# Patient Record
Sex: FEMALE | Race: BLACK OR AFRICAN AMERICAN | Employment: FULL TIME | URBAN - METROPOLITAN AREA
[De-identification: names, ages, dates, MRNs, and addresses within clinical notes are randomized per-mention and may not be internally consistent; named-entity substitution may affect disease eponyms.]

---

## 2017-06-07 RX ORDER — CLONAZEPAM 0.5 MG/1
0.5 TABLET ORAL NIGHTLY PRN
COMMUNITY
End: 2017-06-10

## 2017-06-07 RX ORDER — ASCORBIC ACID 500 MG
500 TABLET ORAL DAILY
COMMUNITY
End: 2019-12-02

## 2017-06-07 RX ORDER — LORATADINE 10 MG/1
10 TABLET ORAL AS NEEDED
COMMUNITY
End: 2021-07-23

## 2017-06-19 ENCOUNTER — ANESTHESIA EVENT (OUTPATIENT)
Dept: SURGERY | Facility: HOSPITAL | Age: 27
End: 2017-06-19
Payer: COMMERCIAL

## 2017-06-19 ENCOUNTER — SURGERY (OUTPATIENT)
Age: 27
End: 2017-06-19

## 2017-06-19 ENCOUNTER — HOSPITAL ENCOUNTER (OUTPATIENT)
Facility: HOSPITAL | Age: 27
Setting detail: HOSPITAL OUTPATIENT SURGERY
Discharge: HOME | End: 2017-06-19
Attending: SURGERY | Admitting: SURGERY
Payer: COMMERCIAL

## 2017-06-19 ENCOUNTER — ANESTHESIA (OUTPATIENT)
Dept: SURGERY | Facility: HOSPITAL | Age: 27
End: 2017-06-19
Payer: COMMERCIAL

## 2017-06-19 VITALS
OXYGEN SATURATION: 100 % | BODY MASS INDEX: 36.02 KG/M2 | RESPIRATION RATE: 18 BRPM | DIASTOLIC BLOOD PRESSURE: 75 MMHG | WEIGHT: 211 LBS | SYSTOLIC BLOOD PRESSURE: 112 MMHG | HEIGHT: 64 IN | HEART RATE: 105 BPM | TEMPERATURE: 98 F

## 2017-06-19 PROCEDURE — 81025 URINE PREGNANCY TEST: CPT | Performed by: SURGERY

## 2017-06-19 PROCEDURE — 0HQ8XZZ REPAIR BUTTOCK SKIN, EXTERNAL APPROACH: ICD-10-PCS | Performed by: SURGERY

## 2017-06-19 PROCEDURE — 88304 TISSUE EXAM BY PATHOLOGIST: CPT | Performed by: SURGERY

## 2017-06-19 PROCEDURE — 0HB8XZZ EXCISION OF BUTTOCK SKIN, EXTERNAL APPROACH: ICD-10-PCS | Performed by: SURGERY

## 2017-06-19 RX ORDER — SODIUM CHLORIDE, SODIUM LACTATE, POTASSIUM CHLORIDE, CALCIUM CHLORIDE 600; 310; 30; 20 MG/100ML; MG/100ML; MG/100ML; MG/100ML
INJECTION, SOLUTION INTRAVENOUS CONTINUOUS
Status: DISCONTINUED | OUTPATIENT
Start: 2017-06-19 | End: 2017-06-19

## 2017-06-19 RX ORDER — HYDROCODONE BITARTRATE AND ACETAMINOPHEN 5; 325 MG/1; MG/1
2 TABLET ORAL AS NEEDED
Status: COMPLETED | OUTPATIENT
Start: 2017-06-19 | End: 2017-06-19

## 2017-06-19 RX ORDER — GENTAMICIN SULFATE 80 MG/100ML
INJECTION, SOLUTION INTRAVENOUS
Status: DISCONTINUED | OUTPATIENT
Start: 2017-06-19 | End: 2017-06-19

## 2017-06-19 RX ORDER — MEPERIDINE HYDROCHLORIDE 25 MG/ML
12.5 INJECTION INTRAMUSCULAR; INTRAVENOUS; SUBCUTANEOUS AS NEEDED
Status: DISCONTINUED | OUTPATIENT
Start: 2017-06-19 | End: 2017-06-19

## 2017-06-19 RX ORDER — HYDROCODONE BITARTRATE AND ACETAMINOPHEN 5; 325 MG/1; MG/1
1-2 TABLET ORAL EVERY 6 HOURS PRN
Qty: 60 TABLET | Refills: 0 | Status: SHIPPED | OUTPATIENT
Start: 2017-06-19 | End: 2017-09-27

## 2017-06-19 RX ORDER — ONDANSETRON 2 MG/ML
4 INJECTION INTRAMUSCULAR; INTRAVENOUS AS NEEDED
Status: DISCONTINUED | OUTPATIENT
Start: 2017-06-19 | End: 2017-06-19

## 2017-06-19 RX ORDER — METOCLOPRAMIDE HYDROCHLORIDE 5 MG/ML
10 INJECTION INTRAMUSCULAR; INTRAVENOUS AS NEEDED
Status: DISCONTINUED | OUTPATIENT
Start: 2017-06-19 | End: 2017-06-19

## 2017-06-19 RX ORDER — HYDROMORPHONE HYDROCHLORIDE 1 MG/ML
0.4 INJECTION, SOLUTION INTRAMUSCULAR; INTRAVENOUS; SUBCUTANEOUS EVERY 5 MIN PRN
Status: DISCONTINUED | OUTPATIENT
Start: 2017-06-19 | End: 2017-06-19

## 2017-06-19 RX ORDER — HYDROCODONE BITARTRATE AND ACETAMINOPHEN 5; 325 MG/1; MG/1
1 TABLET ORAL AS NEEDED
Status: COMPLETED | OUTPATIENT
Start: 2017-06-19 | End: 2017-06-19

## 2017-06-19 RX ORDER — BUPIVACAINE HYDROCHLORIDE 5 MG/ML
INJECTION, SOLUTION EPIDURAL; INTRACAUDAL AS NEEDED
Status: DISCONTINUED | OUTPATIENT
Start: 2017-06-19 | End: 2017-06-19 | Stop reason: HOSPADM

## 2017-06-19 RX ORDER — LIDOCAINE HYDROCHLORIDE AND EPINEPHRINE 10; 10 MG/ML; UG/ML
INJECTION, SOLUTION INFILTRATION; PERINEURAL AS NEEDED
Status: DISCONTINUED | OUTPATIENT
Start: 2017-06-19 | End: 2017-06-19 | Stop reason: HOSPADM

## 2017-06-19 RX ORDER — NALOXONE HYDROCHLORIDE 0.4 MG/ML
80 INJECTION, SOLUTION INTRAMUSCULAR; INTRAVENOUS; SUBCUTANEOUS AS NEEDED
Status: DISCONTINUED | OUTPATIENT
Start: 2017-06-19 | End: 2017-06-19

## 2017-06-19 RX ORDER — CLINDAMYCIN PHOSPHATE 900 MG/50ML
900 INJECTION INTRAVENOUS ONCE
Status: DISCONTINUED | OUTPATIENT
Start: 2017-06-19 | End: 2017-06-19 | Stop reason: HOSPADM

## 2017-06-19 RX ORDER — CLINDAMYCIN PHOSPHATE 900 MG/50ML
INJECTION INTRAVENOUS
Status: DISCONTINUED | OUTPATIENT
Start: 2017-06-19 | End: 2017-06-19

## 2017-06-19 NOTE — ANESTHESIA POSTPROCEDURE EVALUATION
[id number] [facility] Patient Status:  Hospital Outpatient Surgery   Age/Gender 27year old female MRN [id number]   [facility] SURGERY Attending Mariposa Self MD   Hosp Day # 0 PCP Vanita Pinto MD       Anesthesia Post-op

## 2017-06-19 NOTE — BRIEF OP NOTE
Pre-Operative Diagnosis: ANAL CYST     Post-Operative Diagnosis: ANAL CYST     Procedure Performed:   Procedure(s):  ANAL EXAMINATION UNDER ANESTHESIA; EXCISION OR RIGHT ANAL SEBACEOUS CYST    Surgeon(s) and Role:     Valencia Cisneros MD - Primary    Assi

## 2017-06-19 NOTE — ANESTHESIA PREPROCEDURE EVALUATION
PRE-OP EVALUATION    Patient Name: Keturah Burgess    Pre-op Diagnosis: ANAL CYST    Procedure(s):  ANAL EXAMINATION UNDER ANESTHESIA; EXCISION ANAL SEBACEOUS CYST, POSSIBLE FISTULOTOMY    Surgeon(s) and Role:     Ольга Collins MD - Primary    Pre-op vit GI/Hepatic/Renal    Negative GI/hepatic/renal ROS. Cardiovascular                (+) obesity                                       Endo/Other    Negative endo/other ROS.                               Pulmonary

## 2017-06-19 NOTE — H&P
HPI:    Caro Ha is a 27year old female who presents for evaluation of inflammation and tenderness right gluteal skin. Patient has a history of recurring infections in this region. She will describe a small nodule developing with drainage.  Patient g  Rfl: 2          Ceclor                  Unknown    Comment:As a child  Morphine                Nausea only, Dizziness  Sulfa Antibiotics       Nausea and vomiting      Family History    Problem  Relation  Age of Onset    •  Diabetes        •  Alcohol an

## 2017-06-20 NOTE — OPERATIVE REPORT
[facility]    PATIENT'S NAME: Moshe Hawley   ATTENDING PHYSICIAN: Freddie Serna M.D. OPERATING PHYSICIAN: Freddie Serna M.D.    PATIENT ACCOUNT#:   [de-identified]    LOCATION:  PREOPASCC  PRE ASCC 1 EDWP 10  MEDICAL RECORD #:   [id number]       DATE OF cyst or a chronic folliculitis with underlying granulation tissue. Await final pathology. The wound was sterilely dressed. Local anesthesia was infiltrated in the area. The patient tolerated the procedure well.     Dictated By Gente Gilmore M.D.  d: 06/

## 2018-05-02 ENCOUNTER — HOSPITAL ENCOUNTER (EMERGENCY)
Facility: HOSPITAL | Age: 28
Discharge: HOME | End: 2018-05-02
Attending: EMERGENCY MEDICINE
Payer: COMMERCIAL

## 2018-05-02 ENCOUNTER — APPOINTMENT (OUTPATIENT)
Dept: ULTRASOUND IMAGING | Facility: HOSPITAL | Age: 28
End: 2018-05-02
Attending: EMERGENCY MEDICINE
Payer: COMMERCIAL

## 2018-05-02 VITALS
BODY MASS INDEX: 37.39 KG/M2 | OXYGEN SATURATION: 100 % | HEIGHT: 64 IN | HEART RATE: 82 BPM | TEMPERATURE: 99 F | DIASTOLIC BLOOD PRESSURE: 85 MMHG | WEIGHT: 219 LBS | SYSTOLIC BLOOD PRESSURE: 120 MMHG | RESPIRATION RATE: 20 BRPM

## 2018-05-02 DIAGNOSIS — R10.2 PELVIC PAIN IN FEMALE: ICD-10-CM

## 2018-05-02 DIAGNOSIS — D25.9 UTERINE LEIOMYOMA, UNSPECIFIED LOCATION: ICD-10-CM

## 2018-05-02 DIAGNOSIS — N94.6 DYSMENORRHEA: Primary | ICD-10-CM

## 2018-05-02 PROCEDURE — 96375 TX/PRO/DX INJ NEW DRUG ADDON: CPT

## 2018-05-02 PROCEDURE — 76856 US EXAM PELVIC COMPLETE: CPT | Performed by: EMERGENCY MEDICINE

## 2018-05-02 PROCEDURE — 76830 TRANSVAGINAL US NON-OB: CPT | Performed by: EMERGENCY MEDICINE

## 2018-05-02 PROCEDURE — 93975 VASCULAR STUDY: CPT | Performed by: EMERGENCY MEDICINE

## 2018-05-02 PROCEDURE — 96376 TX/PRO/DX INJ SAME DRUG ADON: CPT

## 2018-05-02 PROCEDURE — 99284 EMERGENCY DEPT VISIT MOD MDM: CPT

## 2018-05-02 PROCEDURE — 85025 COMPLETE CBC W/AUTO DIFF WBC: CPT | Performed by: EMERGENCY MEDICINE

## 2018-05-02 PROCEDURE — 80053 COMPREHEN METABOLIC PANEL: CPT | Performed by: EMERGENCY MEDICINE

## 2018-05-02 PROCEDURE — 96374 THER/PROPH/DIAG INJ IV PUSH: CPT

## 2018-05-02 PROCEDURE — 84702 CHORIONIC GONADOTROPIN TEST: CPT | Performed by: EMERGENCY MEDICINE

## 2018-05-02 RX ORDER — ONDANSETRON 2 MG/ML
4 INJECTION INTRAMUSCULAR; INTRAVENOUS ONCE
Status: COMPLETED | OUTPATIENT
Start: 2018-05-02 | End: 2018-05-02

## 2018-05-02 RX ORDER — HYDROMORPHONE HYDROCHLORIDE 1 MG/ML
0.5 INJECTION, SOLUTION INTRAMUSCULAR; INTRAVENOUS; SUBCUTANEOUS EVERY 30 MIN PRN
Status: DISCONTINUED | OUTPATIENT
Start: 2018-05-02 | End: 2018-05-02

## 2018-05-02 RX ORDER — CYCLOBENZAPRINE HCL 10 MG
10 TABLET ORAL 3 TIMES DAILY PRN
Qty: 20 TABLET | Refills: 0 | Status: SHIPPED | OUTPATIENT
Start: 2018-05-02 | End: 2019-12-02

## 2018-05-02 RX ORDER — NAPROXEN 500 MG/1
500 TABLET ORAL 2 TIMES DAILY PRN
Qty: 20 TABLET | Refills: 0 | Status: SHIPPED | OUTPATIENT
Start: 2018-05-02 | End: 2019-12-02

## 2018-05-02 NOTE — ED PROVIDER NOTES
Patient Seen in: [facility] Emergency Department    History   Patient presents with:  Eval-G (gynecologic)  Pregnancy Issues (gynecologic)    Stated Complaint: possible ectopic    HPI    22-year-old female presents to the emergency department with c Alcohol use: Yes           0.0 oz/week     Comment: social      Review of Systems   All other systems reviewed and are negative. Positive for stated complaint: possible ectopic  Other systems are as noted in HPI.   Constitutional a ED Course     Labs Reviewed   COMP METABOLIC PANEL (14) - Abnormal; Notable for the following:        Result Value    Glucose 109 (*)     Alkaline Phosphatase 116 (*)     All other components within normal limits   HCG, BETA SUBUNIT (QUANT PREGNANCY TE this is most likely the culprit. I advised that she follow-up with OB/GYN. She may also contact her primary care physician for evaluation or referral.  Patient is much more comfortable after having received medications.   She subsequently discharged

## 2018-05-02 NOTE — ED INITIAL ASSESSMENT (HPI)
Arrives with left inguinal pain. States cramping for a week and this morning became severe. Reports vaginal bleeding since 0600.

## 2020-10-17 ENCOUNTER — HOSPITAL ENCOUNTER (EMERGENCY)
Facility: HOSPITAL | Age: 30
Discharge: HOME | End: 2020-10-17
Attending: EMERGENCY MEDICINE
Payer: MEDICAID

## 2020-10-17 VITALS
SYSTOLIC BLOOD PRESSURE: 123 MMHG | RESPIRATION RATE: 18 BRPM | HEART RATE: 89 BPM | DIASTOLIC BLOOD PRESSURE: 78 MMHG | HEIGHT: 64 IN | WEIGHT: 195 LBS | OXYGEN SATURATION: 98 % | BODY MASS INDEX: 33.29 KG/M2 | TEMPERATURE: 98 F

## 2020-10-17 DIAGNOSIS — T74.21XA SEXUAL ASSAULT OF ADULT, INITIAL ENCOUNTER: Primary | ICD-10-CM

## 2020-10-17 PROCEDURE — 81025 URINE PREGNANCY TEST: CPT

## 2020-10-17 PROCEDURE — 99284 EMERGENCY DEPT VISIT MOD MDM: CPT

## 2020-10-17 PROCEDURE — 87591 N.GONORRHOEAE DNA AMP PROB: CPT | Performed by: EMERGENCY MEDICINE

## 2020-10-17 PROCEDURE — 87480 CANDIDA DNA DIR PROBE: CPT | Performed by: EMERGENCY MEDICINE

## 2020-10-17 PROCEDURE — 81003 URINALYSIS AUTO W/O SCOPE: CPT | Performed by: EMERGENCY MEDICINE

## 2020-10-17 PROCEDURE — 87529 HSV DNA AMP PROBE: CPT | Performed by: EMERGENCY MEDICINE

## 2020-10-17 PROCEDURE — 86780 TREPONEMA PALLIDUM: CPT | Performed by: EMERGENCY MEDICINE

## 2020-10-17 PROCEDURE — 36415 COLL VENOUS BLD VENIPUNCTURE: CPT

## 2020-10-17 PROCEDURE — 87660 TRICHOMONAS VAGIN DIR PROBE: CPT | Performed by: EMERGENCY MEDICINE

## 2020-10-17 PROCEDURE — 87491 CHLMYD TRACH DNA AMP PROBE: CPT | Performed by: EMERGENCY MEDICINE

## 2020-10-17 PROCEDURE — 87510 GARDNER VAG DNA DIR PROBE: CPT | Performed by: EMERGENCY MEDICINE

## 2020-10-17 PROCEDURE — 86701 HIV-1ANTIBODY: CPT | Performed by: EMERGENCY MEDICINE

## 2020-10-17 RX ORDER — IBUPROFEN 600 MG/1
600 TABLET ORAL ONCE
Status: COMPLETED | OUTPATIENT
Start: 2020-10-17 | End: 2020-10-17

## 2020-10-17 RX ORDER — AZITHROMYCIN 250 MG/1
1000 TABLET, FILM COATED ORAL ONCE
Status: DISCONTINUED | OUTPATIENT
Start: 2020-10-17 | End: 2020-10-17
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
## 2020-10-17 NOTE — ED PROVIDER NOTES
Patient Seen in: [facility] Emergency Department      History   Patient presents with:  Eval-S    Stated Complaint: eval-s    HPI    The patient is a 22-year-old female presenting to the emergency department requesting examination for possible STDs Comment: social    Drug use: No             Review of Systems    Positive for stated complaint: eval-s. No fevers or chills. No cough or cold symptoms. No abdominal pain. There is just a little irritation along her labia.   No dysuria or change in her f tissue. No ulcerations. No other external lesions. No lymphadenopathy. On insertion of the speculum, normal vaginal vault on inspection. Cervix is closed and normal no lesions.   No significant I reviewed the results of the patient, and the patient fel that she should follow-up for repeat testing in a couple of weeks at the health department. MDM          Patient is nontoxic in appearance.   I do not believe there is any emergent condition that require any further diagnostic or therapeutic interve
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
## 2023-09-22 ENCOUNTER — HOSPITAL ENCOUNTER (OUTPATIENT)
Dept: GENERAL RADIOLOGY | Age: 33
Discharge: HOME | End: 2023-09-22
Attending: PHYSICIAN ASSISTANT
Payer: COMMERCIAL

## 2023-09-22 ENCOUNTER — TELEPHONE (OUTPATIENT)
Dept: ORTHOPEDICS CLINIC | Facility: CLINIC | Age: 33
End: 2023-09-22

## 2023-09-22 DIAGNOSIS — M25.539 PAIN IN WRIST, UNSPECIFIED LATERALITY: ICD-10-CM

## 2023-09-22 DIAGNOSIS — M25.539 PAIN IN WRIST, UNSPECIFIED LATERALITY: Primary | ICD-10-CM

## 2023-09-22 PROCEDURE — 73110 X-RAY EXAM OF WRIST: CPT | Performed by: PHYSICIAN ASSISTANT

## 2023-09-22 NOTE — TELEPHONE ENCOUNTER
Patient has appt for RT wrist pain. At this time patient has not had any imaging done, please place RX accordingly. I have notified the patient to come in 20-30 min prior to appointment time to have the imaging done . Please forward to the  pool to schedule imaging. Thank you.       Future Appointments   Date Time Provider Karlos Silva   10/12/2023  1:20 PM DOUGLAS Taylor [facility]

## 2023-10-12 ENCOUNTER — OFFICE VISIT (OUTPATIENT)
Dept: ORTHOPEDICS CLINIC | Facility: CLINIC | Age: 33
End: 2023-10-12
Payer: COMMERCIAL

## 2023-10-12 VITALS — BODY MASS INDEX: 29.71 KG/M2 | HEIGHT: 64 IN | WEIGHT: 174 LBS

## 2023-10-12 DIAGNOSIS — M25.539 PAIN IN WRIST, UNSPECIFIED LATERALITY: Primary | ICD-10-CM

## 2023-10-12 PROCEDURE — 99203 OFFICE O/P NEW LOW 30 MIN: CPT | Performed by: PHYSICIAN ASSISTANT

## 2023-10-12 PROCEDURE — 3008F BODY MASS INDEX DOCD: CPT | Performed by: PHYSICIAN ASSISTANT

## 2023-10-12 RX ORDER — SUMATRIPTAN 50 MG/1
1 TABLET, FILM COATED ORAL EVERY 2 HOUR PRN
COMMUNITY
Start: 2023-04-28

## 2023-10-12 RX ORDER — FLUCONAZOLE 150 MG/1
TABLET ORAL
COMMUNITY
Start: 2023-04-28

## 2023-10-12 RX ORDER — LORATADINE 10 MG/1
10 TABLET ORAL DAILY
COMMUNITY

## 2023-10-12 RX ORDER — NYSTATIN AND TRIAMCINOLONE ACETONIDE 100000; 1 [USP'U]/G; MG/G
OINTMENT TOPICAL
COMMUNITY
Start: 2022-09-22

## 2023-10-13 ENCOUNTER — TELEPHONE (OUTPATIENT)
Dept: ORTHOPEDICS CLINIC | Facility: CLINIC | Age: 33
End: 2023-10-13

## 2023-10-13 RX ORDER — IBUPROFEN AND FAMOTIDINE 26.6; 8 MG/1; MG/1
TABLET ORAL
Qty: 90 TABLET | Refills: 0 | Status: CANCELLED | OUTPATIENT
Start: 2023-10-13 | End: 2023-11-12

## 2023-10-13 RX ORDER — MELOXICAM 15 MG/1
15 TABLET ORAL DAILY
Qty: 30 TABLET | Refills: 0 | Status: SHIPPED | OUTPATIENT
Start: 2023-10-13

## 2023-10-13 NOTE — TELEPHONE ENCOUNTER
Patient is requesting a call back in regards to her medication that was supposed to be sent to the pharmacy on 10/12.

## 2023-10-13 NOTE — TELEPHONE ENCOUNTER
Patient states she was supposed to have medications sent to her pharmacy, but LOV does not state that. Please advise.

## 2023-11-14 ENCOUNTER — APPOINTMENT (OUTPATIENT)
Dept: ULTRASOUND IMAGING | Facility: HOSPITAL | Age: 33
End: 2023-11-14
Attending: EMERGENCY MEDICINE
Payer: COMMERCIAL

## 2023-11-14 ENCOUNTER — APPOINTMENT (OUTPATIENT)
Dept: MRI IMAGING | Facility: HOSPITAL | Age: 33
End: 2023-11-14
Attending: EMERGENCY MEDICINE
Payer: COMMERCIAL

## 2023-11-14 ENCOUNTER — ANESTHESIA (OUTPATIENT)
Dept: SURGERY | Facility: HOSPITAL | Age: 33
End: 2023-11-14
Payer: COMMERCIAL

## 2023-11-14 ENCOUNTER — HOSPITAL ENCOUNTER (INPATIENT)
Facility: HOSPITAL | Age: 33
LOS: 1 days | Discharge: HOME | End: 2023-11-15
Attending: EMERGENCY MEDICINE | Admitting: HOSPITALIST
Payer: COMMERCIAL

## 2023-11-14 ENCOUNTER — HOSPITAL ENCOUNTER (OUTPATIENT)
Facility: HOSPITAL | Age: 33
Setting detail: OBSERVATION
Discharge: HOME | End: 2023-11-15
Attending: EMERGENCY MEDICINE | Admitting: HOSPITALIST
Payer: COMMERCIAL

## 2023-11-14 ENCOUNTER — APPOINTMENT (OUTPATIENT)
Dept: CT IMAGING | Facility: HOSPITAL | Age: 33
End: 2023-11-14
Attending: EMERGENCY MEDICINE
Payer: COMMERCIAL

## 2023-11-14 ENCOUNTER — ANESTHESIA EVENT (OUTPATIENT)
Dept: SURGERY | Facility: HOSPITAL | Age: 33
End: 2023-11-14
Payer: COMMERCIAL

## 2023-11-14 DIAGNOSIS — R19.00 PELVIC MASS: Primary | ICD-10-CM

## 2023-11-14 DIAGNOSIS — D25.9: ICD-10-CM

## 2023-11-14 LAB
ALBUMIN SERPL-MCNC: 3.5 G/DL (ref 3.4–5)
ALBUMIN/GLOB SERPL: 0.9 {RATIO} (ref 1–2)
ALP LIVER SERPL-CCNC: 88 U/L
ALT SERPL-CCNC: 14 U/L
ANION GAP SERPL CALC-SCNC: 7 MMOL/L (ref 0–18)
AST SERPL-CCNC: 6 U/L (ref 15–37)
B-HCG UR QL: NEGATIVE
BASOPHILS # BLD AUTO: 0.04 X10(3) UL (ref 0–0.2)
BASOPHILS NFR BLD AUTO: 0.3 %
BILIRUB SERPL-MCNC: 0.8 MG/DL (ref 0.1–2)
BILIRUB UR QL STRIP.AUTO: NEGATIVE
BUN BLD-MCNC: 12 MG/DL (ref 9–23)
CALCIUM BLD-MCNC: 9 MG/DL (ref 8.5–10.1)
CANCER AG125 SERPL-ACNC: 30 U/ML (ref ?–35)
CEA SERPL-MCNC: 1.2 NG/ML (ref ?–5)
CHLORIDE SERPL-SCNC: 107 MMOL/L (ref 98–112)
CLARITY UR REFRACT.AUTO: CLEAR
CO2 SERPL-SCNC: 24 MMOL/L (ref 21–32)
CREAT BLD-MCNC: 0.94 MG/DL
EGFRCR SERPLBLD CKD-EPI 2021: 83 ML/MIN/1.73M2 (ref 60–?)
EOSINOPHIL # BLD AUTO: 0.15 X10(3) UL (ref 0–0.7)
EOSINOPHIL NFR BLD AUTO: 1.3 %
ERYTHROCYTE [DISTWIDTH] IN BLOOD BY AUTOMATED COUNT: 13 %
GLOBULIN PLAS-MCNC: 3.7 G/DL (ref 2.8–4.4)
GLUCOSE BLD-MCNC: 101 MG/DL (ref 70–99)
GLUCOSE UR STRIP.AUTO-MCNC: NORMAL MG/DL
HCT VFR BLD AUTO: 41.2 %
HGB BLD-MCNC: 14.1 G/DL
IMM GRANULOCYTES # BLD AUTO: 0.04 X10(3) UL (ref 0–1)
IMM GRANULOCYTES NFR BLD: 0.3 %
KETONES UR STRIP.AUTO-MCNC: NEGATIVE MG/DL
LDH SERPL L TO P-CCNC: 223 U/L
LEUKOCYTE ESTERASE UR QL STRIP.AUTO: NEGATIVE
LIPASE SERPL-CCNC: 24 U/L (ref 13–75)
LYMPHOCYTES # BLD AUTO: 3 X10(3) UL (ref 1–4)
LYMPHOCYTES NFR BLD AUTO: 25.2 %
MCH RBC QN AUTO: 28.1 PG (ref 26–34)
MCHC RBC AUTO-ENTMCNC: 34.2 G/DL (ref 31–37)
MCV RBC AUTO: 82.1 FL
MONOCYTES # BLD AUTO: 1.03 X10(3) UL (ref 0.1–1)
MONOCYTES NFR BLD AUTO: 8.6 %
NEUTROPHILS # BLD AUTO: 7.65 X10 (3) UL (ref 1.5–7.7)
NEUTROPHILS # BLD AUTO: 7.65 X10(3) UL (ref 1.5–7.7)
NEUTROPHILS NFR BLD AUTO: 64.3 %
NITRITE UR QL STRIP.AUTO: NEGATIVE
OSMOLALITY SERPL CALC.SUM OF ELEC: 286 MOSM/KG (ref 275–295)
PH UR STRIP.AUTO: 7.5 [PH] (ref 5–8)
PLATELET # BLD AUTO: 271 10(3)UL (ref 150–450)
POTASSIUM SERPL-SCNC: 3.7 MMOL/L (ref 3.5–5.1)
PROT SERPL-MCNC: 7.2 G/DL (ref 6.4–8.2)
PROT UR STRIP.AUTO-MCNC: NEGATIVE MG/DL
RBC # BLD AUTO: 5.02 X10(6)UL
RBC UR QL AUTO: NEGATIVE
SODIUM SERPL-SCNC: 138 MMOL/L (ref 136–145)
SP GR UR STRIP.AUTO: 1.01 (ref 1–1.03)
UROBILINOGEN UR STRIP.AUTO-MCNC: NORMAL MG/DL
WBC # BLD AUTO: 11.9 X10(3) UL (ref 4–11)

## 2023-11-14 PROCEDURE — 96375 TX/PRO/DX INJ NEW DRUG ADDON: CPT

## 2023-11-14 PROCEDURE — 83615 LACTATE (LD) (LDH) ENZYME: CPT | Performed by: EMERGENCY MEDICINE

## 2023-11-14 PROCEDURE — 96361 HYDRATE IV INFUSION ADD-ON: CPT

## 2023-11-14 PROCEDURE — 81003 URINALYSIS AUTO W/O SCOPE: CPT

## 2023-11-14 PROCEDURE — 83690 ASSAY OF LIPASE: CPT

## 2023-11-14 PROCEDURE — 76856 US EXAM PELVIC COMPLETE: CPT | Performed by: EMERGENCY MEDICINE

## 2023-11-14 PROCEDURE — 0UB94ZZ EXCISION OF UTERUS, PERCUTANEOUS ENDOSCOPIC APPROACH: ICD-10-PCS | Performed by: OBSTETRICS & GYNECOLOGY

## 2023-11-14 PROCEDURE — 93975 VASCULAR STUDY: CPT | Performed by: EMERGENCY MEDICINE

## 2023-11-14 PROCEDURE — 74177 CT ABD & PELVIS W/CONTRAST: CPT | Performed by: EMERGENCY MEDICINE

## 2023-11-14 PROCEDURE — 72197 MRI PELVIS W/O & W/DYE: CPT | Performed by: EMERGENCY MEDICINE

## 2023-11-14 PROCEDURE — 80053 COMPREHEN METABOLIC PANEL: CPT | Performed by: EMERGENCY MEDICINE

## 2023-11-14 PROCEDURE — 86304 IMMUNOASSAY TUMOR CA 125: CPT | Performed by: EMERGENCY MEDICINE

## 2023-11-14 PROCEDURE — 82378 CARCINOEMBRYONIC ANTIGEN: CPT | Performed by: EMERGENCY MEDICINE

## 2023-11-14 PROCEDURE — 81025 URINE PREGNANCY TEST: CPT

## 2023-11-14 PROCEDURE — 88305 TISSUE EXAM BY PATHOLOGIST: CPT | Performed by: OBSTETRICS & GYNECOLOGY

## 2023-11-14 PROCEDURE — 85025 COMPLETE CBC W/AUTO DIFF WBC: CPT | Performed by: EMERGENCY MEDICINE

## 2023-11-14 PROCEDURE — 81003 URINALYSIS AUTO W/O SCOPE: CPT | Performed by: EMERGENCY MEDICINE

## 2023-11-14 PROCEDURE — 99285 EMERGENCY DEPT VISIT HI MDM: CPT

## 2023-11-14 PROCEDURE — 96374 THER/PROPH/DIAG INJ IV PUSH: CPT

## 2023-11-14 PROCEDURE — 80053 COMPREHEN METABOLIC PANEL: CPT

## 2023-11-14 PROCEDURE — 96376 TX/PRO/DX INJ SAME DRUG ADON: CPT

## 2023-11-14 PROCEDURE — 76830 TRANSVAGINAL US NON-OB: CPT | Performed by: EMERGENCY MEDICINE

## 2023-11-14 PROCEDURE — 83690 ASSAY OF LIPASE: CPT | Performed by: EMERGENCY MEDICINE

## 2023-11-14 PROCEDURE — A9575 INJ GADOTERATE MEGLUMI 0.1ML: HCPCS | Performed by: HOSPITALIST

## 2023-11-14 PROCEDURE — 85025 COMPLETE CBC W/AUTO DIFF WBC: CPT

## 2023-11-14 RX ORDER — SODIUM CHLORIDE, SODIUM LACTATE, POTASSIUM CHLORIDE, CALCIUM CHLORIDE 600; 310; 30; 20 MG/100ML; MG/100ML; MG/100ML; MG/100ML
INJECTION, SOLUTION INTRAVENOUS CONTINUOUS PRN
Status: DISCONTINUED | OUTPATIENT
Start: 2023-11-14 | End: 2023-11-14 | Stop reason: SURG

## 2023-11-14 RX ORDER — ROCURONIUM BROMIDE 10 MG/ML
INJECTION, SOLUTION INTRAVENOUS AS NEEDED
Status: DISCONTINUED | OUTPATIENT
Start: 2023-11-14 | End: 2023-11-14 | Stop reason: SURG

## 2023-11-14 RX ORDER — LIDOCAINE HYDROCHLORIDE 10 MG/ML
INJECTION, SOLUTION EPIDURAL; INFILTRATION; INTRACAUDAL; PERINEURAL AS NEEDED
Status: DISCONTINUED | OUTPATIENT
Start: 2023-11-14 | End: 2023-11-14 | Stop reason: SURG

## 2023-11-14 RX ORDER — PROCHLORPERAZINE EDISYLATE 5 MG/ML
INJECTION INTRAMUSCULAR; INTRAVENOUS
Status: DISCONTINUED
Start: 2023-11-14 | End: 2023-11-14 | Stop reason: WASHOUT

## 2023-11-14 RX ORDER — PROCHLORPERAZINE EDISYLATE 5 MG/ML
5 INJECTION INTRAMUSCULAR; INTRAVENOUS EVERY 8 HOURS PRN
Status: DISCONTINUED | OUTPATIENT
Start: 2023-11-14 | End: 2023-11-15 | Stop reason: HOSPADM

## 2023-11-14 RX ORDER — HYDROMORPHONE HYDROCHLORIDE 1 MG/ML
0.4 INJECTION, SOLUTION INTRAMUSCULAR; INTRAVENOUS; SUBCUTANEOUS EVERY 2 HOUR PRN
Status: DISCONTINUED | OUTPATIENT
Start: 2023-11-14 | End: 2023-11-15

## 2023-11-14 RX ORDER — HEPARIN SODIUM 5000 [USP'U]/ML
5000 INJECTION, SOLUTION INTRAVENOUS; SUBCUTANEOUS EVERY 8 HOURS SCHEDULED
Status: DISCONTINUED | OUTPATIENT
Start: 2023-11-14 | End: 2023-11-15

## 2023-11-14 RX ORDER — RIZATRIPTAN BENZOATE 10 MG/1
10 TABLET ORAL AS NEEDED
COMMUNITY

## 2023-11-14 RX ORDER — DEXTROSE AND SODIUM CHLORIDE 5; .45 G/100ML; G/100ML
INJECTION, SOLUTION INTRAVENOUS CONTINUOUS
Status: ACTIVE | OUTPATIENT
Start: 2023-11-14 | End: 2023-11-14

## 2023-11-14 RX ORDER — OMEGA-3S/DHA/EPA/FISH OIL 1000-1400
2 CAPSULE,DELAYED RELEASE (ENTERIC COATED) ORAL DAILY
COMMUNITY

## 2023-11-14 RX ORDER — ONDANSETRON 2 MG/ML
INJECTION INTRAMUSCULAR; INTRAVENOUS
Status: COMPLETED
Start: 2023-11-14 | End: 2023-11-14

## 2023-11-14 RX ORDER — CEFAZOLIN SODIUM/WATER 2 G/20 ML
2 SYRINGE (ML) INTRAVENOUS ONCE
Status: DISCONTINUED | OUTPATIENT
Start: 2023-11-14 | End: 2023-11-15

## 2023-11-14 RX ORDER — ONDANSETRON 2 MG/ML
4 INJECTION INTRAMUSCULAR; INTRAVENOUS EVERY 6 HOURS PRN
Status: DISCONTINUED | OUTPATIENT
Start: 2023-11-14 | End: 2023-11-15 | Stop reason: HOSPADM

## 2023-11-14 RX ORDER — ONDANSETRON 2 MG/ML
4 INJECTION INTRAMUSCULAR; INTRAVENOUS EVERY 6 HOURS PRN
Status: DISCONTINUED | OUTPATIENT
Start: 2023-11-14 | End: 2023-11-15

## 2023-11-14 RX ORDER — HYDROMORPHONE HYDROCHLORIDE 1 MG/ML
0.8 INJECTION, SOLUTION INTRAMUSCULAR; INTRAVENOUS; SUBCUTANEOUS EVERY 2 HOUR PRN
Status: DISCONTINUED | OUTPATIENT
Start: 2023-11-14 | End: 2023-11-15

## 2023-11-14 RX ORDER — SODIUM CHLORIDE, SODIUM LACTATE, POTASSIUM CHLORIDE, CALCIUM CHLORIDE 600; 310; 30; 20 MG/100ML; MG/100ML; MG/100ML; MG/100ML
INJECTION, SOLUTION INTRAVENOUS CONTINUOUS
Status: DISCONTINUED | OUTPATIENT
Start: 2023-11-14 | End: 2023-11-15 | Stop reason: HOSPADM

## 2023-11-14 RX ORDER — SODIUM CHLORIDE 9 MG/ML
INJECTION, SOLUTION INTRAVENOUS CONTINUOUS
Status: DISCONTINUED | OUTPATIENT
Start: 2023-11-14 | End: 2023-11-15

## 2023-11-14 RX ORDER — ACETAMINOPHEN 500 MG
500 TABLET ORAL EVERY 4 HOURS PRN
Status: DISCONTINUED | OUTPATIENT
Start: 2023-11-14 | End: 2023-11-15

## 2023-11-14 RX ORDER — TRIAMCINOLONE ACETONIDE 1 MG/G
CREAM TOPICAL
COMMUNITY
Start: 2023-11-09

## 2023-11-14 RX ORDER — KETOROLAC TROMETHAMINE 30 MG/ML
INJECTION, SOLUTION INTRAMUSCULAR; INTRAVENOUS AS NEEDED
Status: DISCONTINUED | OUTPATIENT
Start: 2023-11-14 | End: 2023-11-14 | Stop reason: SURG

## 2023-11-14 RX ORDER — KETOROLAC TROMETHAMINE 15 MG/ML
15 INJECTION, SOLUTION INTRAMUSCULAR; INTRAVENOUS ONCE
Status: COMPLETED | OUTPATIENT
Start: 2023-11-14 | End: 2023-11-14

## 2023-11-14 RX ORDER — HYDROMORPHONE HYDROCHLORIDE 1 MG/ML
0.6 INJECTION, SOLUTION INTRAMUSCULAR; INTRAVENOUS; SUBCUTANEOUS EVERY 5 MIN PRN
Status: DISCONTINUED | OUTPATIENT
Start: 2023-11-14 | End: 2023-11-15 | Stop reason: HOSPADM

## 2023-11-14 RX ORDER — VASOPRESSIN 20 U/ML
INJECTION PARENTERAL AS NEEDED
Status: DISCONTINUED | OUTPATIENT
Start: 2023-11-14 | End: 2023-11-14 | Stop reason: HOSPADM

## 2023-11-14 RX ORDER — HYDROCODONE BITARTRATE AND ACETAMINOPHEN 5; 325 MG/1; MG/1
1 TABLET ORAL EVERY 4 HOURS PRN
Status: DISCONTINUED | OUTPATIENT
Start: 2023-11-14 | End: 2023-11-15

## 2023-11-14 RX ORDER — HYDROCODONE BITARTRATE AND ACETAMINOPHEN 5; 325 MG/1; MG/1
2 TABLET ORAL EVERY 4 HOURS PRN
Status: DISCONTINUED | OUTPATIENT
Start: 2023-11-14 | End: 2023-11-15

## 2023-11-14 RX ORDER — MIDAZOLAM HYDROCHLORIDE 1 MG/ML
INJECTION INTRAMUSCULAR; INTRAVENOUS AS NEEDED
Status: DISCONTINUED | OUTPATIENT
Start: 2023-11-14 | End: 2023-11-14 | Stop reason: SURG

## 2023-11-14 RX ORDER — GADOTERATE MEGLUMINE 376.9 MG/ML
17 INJECTION INTRAVENOUS
Status: COMPLETED | OUTPATIENT
Start: 2023-11-14 | End: 2023-11-14

## 2023-11-14 RX ORDER — HYDROMORPHONE HYDROCHLORIDE 1 MG/ML
0.4 INJECTION, SOLUTION INTRAMUSCULAR; INTRAVENOUS; SUBCUTANEOUS EVERY 5 MIN PRN
Status: DISCONTINUED | OUTPATIENT
Start: 2023-11-14 | End: 2023-11-15 | Stop reason: HOSPADM

## 2023-11-14 RX ORDER — HYDROMORPHONE HYDROCHLORIDE 1 MG/ML
0.2 INJECTION, SOLUTION INTRAMUSCULAR; INTRAVENOUS; SUBCUTANEOUS EVERY 2 HOUR PRN
Status: DISCONTINUED | OUTPATIENT
Start: 2023-11-14 | End: 2023-11-15

## 2023-11-14 RX ORDER — DEXAMETHASONE SODIUM PHOSPHATE 4 MG/ML
VIAL (ML) INJECTION AS NEEDED
Status: DISCONTINUED | OUTPATIENT
Start: 2023-11-14 | End: 2023-11-14 | Stop reason: SURG

## 2023-11-14 RX ORDER — HYDROMORPHONE HYDROCHLORIDE 1 MG/ML
0.2 INJECTION, SOLUTION INTRAMUSCULAR; INTRAVENOUS; SUBCUTANEOUS EVERY 5 MIN PRN
Status: DISCONTINUED | OUTPATIENT
Start: 2023-11-14 | End: 2023-11-15 | Stop reason: HOSPADM

## 2023-11-14 RX ORDER — HYDROMORPHONE HYDROCHLORIDE 1 MG/ML
0.5 INJECTION, SOLUTION INTRAMUSCULAR; INTRAVENOUS; SUBCUTANEOUS EVERY 30 MIN PRN
Status: ACTIVE | OUTPATIENT
Start: 2023-11-14 | End: 2023-11-14

## 2023-11-14 RX ORDER — MELATONIN
3 NIGHTLY PRN
Status: DISCONTINUED | OUTPATIENT
Start: 2023-11-14 | End: 2023-11-15

## 2023-11-14 RX ORDER — PROCHLORPERAZINE EDISYLATE 5 MG/ML
5 INJECTION INTRAMUSCULAR; INTRAVENOUS EVERY 8 HOURS PRN
Status: DISCONTINUED | OUTPATIENT
Start: 2023-11-14 | End: 2023-11-15

## 2023-11-14 RX ORDER — ACETAMINOPHEN 500 MG
1000 TABLET ORAL ONCE AS NEEDED
Status: ACTIVE | OUTPATIENT
Start: 2023-11-14 | End: 2023-11-14

## 2023-11-14 RX ORDER — HYDROCODONE BITARTRATE AND ACETAMINOPHEN 5; 325 MG/1; MG/1
2 TABLET ORAL ONCE AS NEEDED
Status: ACTIVE | OUTPATIENT
Start: 2023-11-14 | End: 2023-11-14

## 2023-11-14 RX ORDER — HYDROCODONE BITARTRATE AND ACETAMINOPHEN 5; 325 MG/1; MG/1
1 TABLET ORAL ONCE AS NEEDED
Status: ACTIVE | OUTPATIENT
Start: 2023-11-14 | End: 2023-11-14

## 2023-11-14 RX ORDER — HYDROMORPHONE HYDROCHLORIDE 1 MG/ML
0.5 INJECTION, SOLUTION INTRAMUSCULAR; INTRAVENOUS; SUBCUTANEOUS EVERY 30 MIN PRN
Status: DISCONTINUED | OUTPATIENT
Start: 2023-11-14 | End: 2023-11-14

## 2023-11-14 RX ORDER — ACETAMINOPHEN 325 MG/1
650 TABLET ORAL EVERY 4 HOURS PRN
Status: DISCONTINUED | OUTPATIENT
Start: 2023-11-14 | End: 2023-11-15

## 2023-11-14 RX ORDER — PREDNISONE 20 MG/1
20 TABLET ORAL DAILY
COMMUNITY

## 2023-11-14 RX ORDER — NALOXONE HYDROCHLORIDE 0.4 MG/ML
80 INJECTION, SOLUTION INTRAMUSCULAR; INTRAVENOUS; SUBCUTANEOUS AS NEEDED
Status: DISCONTINUED | OUTPATIENT
Start: 2023-11-14 | End: 2023-11-15 | Stop reason: HOSPADM

## 2023-11-14 RX ORDER — ONDANSETRON 2 MG/ML
INJECTION INTRAMUSCULAR; INTRAVENOUS AS NEEDED
Status: DISCONTINUED | OUTPATIENT
Start: 2023-11-14 | End: 2023-11-14 | Stop reason: SURG

## 2023-11-14 RX ADMIN — SODIUM CHLORIDE, SODIUM LACTATE, POTASSIUM CHLORIDE, CALCIUM CHLORIDE: 600; 310; 30; 20 INJECTION, SOLUTION INTRAVENOUS at 21:37:00

## 2023-11-14 RX ADMIN — ONDANSETRON 4 MG: 2 INJECTION INTRAMUSCULAR; INTRAVENOUS at 22:04:00

## 2023-11-14 RX ADMIN — KETOROLAC TROMETHAMINE 30 MG: 30 INJECTION, SOLUTION INTRAMUSCULAR; INTRAVENOUS at 23:10:00

## 2023-11-14 RX ADMIN — MIDAZOLAM HYDROCHLORIDE 2 MG: 1 INJECTION INTRAMUSCULAR; INTRAVENOUS at 21:37:00

## 2023-11-14 RX ADMIN — LIDOCAINE HYDROCHLORIDE 50 MG: 10 INJECTION, SOLUTION EPIDURAL; INFILTRATION; INTRACAUDAL; PERINEURAL at 21:39:00

## 2023-11-14 RX ADMIN — DEXAMETHASONE SODIUM PHOSPHATE 4 MG: 4 MG/ML VIAL (ML) INJECTION at 21:50:00

## 2023-11-14 RX ADMIN — ROCURONIUM BROMIDE 50 MG: 10 INJECTION, SOLUTION INTRAVENOUS at 21:39:00

## 2023-11-14 NOTE — ED INITIAL ASSESSMENT (HPI)
Presents with RLQ pain radiating to back/flank, with intermittent mid lower abd pain since Saturday. + nausea no emesis.  Pain worse today

## 2023-11-14 NOTE — ED QUICK NOTES
Orders for admission, patient is aware of plan and ready to go upstairs. Any questions, please call ED RN Elvira Noe  at extension [phone]. Vaccinated?   Type of COVID test sent:  COVID Suspicion level: Low/      Titratable drug(s) infusing:none  Rate:    LOC at time of transport:alert    Other pertinent information:    CIWA score=  NIH score=

## 2023-11-15 VITALS
HEART RATE: 88 BPM | HEIGHT: 64 IN | DIASTOLIC BLOOD PRESSURE: 78 MMHG | TEMPERATURE: 98 F | SYSTOLIC BLOOD PRESSURE: 114 MMHG | BODY MASS INDEX: 31.58 KG/M2 | OXYGEN SATURATION: 100 % | WEIGHT: 185 LBS | RESPIRATION RATE: 18 BRPM

## 2023-11-15 LAB
ALBUMIN SERPL-MCNC: 2.9 G/DL (ref 3.4–5)
ALBUMIN/GLOB SERPL: 0.8 {RATIO} (ref 1–2)
ALP LIVER SERPL-CCNC: 73 U/L
ALT SERPL-CCNC: 10 U/L
ANION GAP SERPL CALC-SCNC: 5 MMOL/L (ref 0–18)
AST SERPL-CCNC: 11 U/L (ref 15–37)
BASOPHILS # BLD AUTO: 0.01 X10(3) UL (ref 0–0.2)
BASOPHILS NFR BLD AUTO: 0.1 %
BILIRUB SERPL-MCNC: 1 MG/DL (ref 0.1–2)
BUN BLD-MCNC: 9 MG/DL (ref 9–23)
CALCIUM BLD-MCNC: 9 MG/DL (ref 8.5–10.1)
CHLORIDE SERPL-SCNC: 108 MMOL/L (ref 98–112)
CO2 SERPL-SCNC: 26 MMOL/L (ref 21–32)
CREAT BLD-MCNC: 0.75 MG/DL
EGFRCR SERPLBLD CKD-EPI 2021: 109 ML/MIN/1.73M2 (ref 60–?)
EOSINOPHIL # BLD AUTO: 0.01 X10(3) UL (ref 0–0.7)
EOSINOPHIL NFR BLD AUTO: 0.1 %
ERYTHROCYTE [DISTWIDTH] IN BLOOD BY AUTOMATED COUNT: 12.9 %
GLOBULIN PLAS-MCNC: 3.5 G/DL (ref 2.8–4.4)
GLUCOSE BLD-MCNC: 120 MG/DL (ref 70–99)
HCT VFR BLD AUTO: 38.2 %
HGB BLD-MCNC: 12.7 G/DL
IMM GRANULOCYTES # BLD AUTO: 0.05 X10(3) UL (ref 0–1)
IMM GRANULOCYTES NFR BLD: 0.5 %
LYMPHOCYTES # BLD AUTO: 0.86 X10(3) UL (ref 1–4)
LYMPHOCYTES NFR BLD AUTO: 8 %
MAGNESIUM SERPL-MCNC: 2 MG/DL (ref 1.6–2.6)
MCH RBC QN AUTO: 28 PG (ref 26–34)
MCHC RBC AUTO-ENTMCNC: 33.2 G/DL (ref 31–37)
MCV RBC AUTO: 84.3 FL
MONOCYTES # BLD AUTO: 0.87 X10(3) UL (ref 0.1–1)
MONOCYTES NFR BLD AUTO: 8.1 %
NEUTROPHILS # BLD AUTO: 8.92 X10 (3) UL (ref 1.5–7.7)
NEUTROPHILS # BLD AUTO: 8.92 X10(3) UL (ref 1.5–7.7)
NEUTROPHILS NFR BLD AUTO: 83.2 %
OSMOLALITY SERPL CALC.SUM OF ELEC: 288 MOSM/KG (ref 275–295)
PLATELET # BLD AUTO: 248 10(3)UL (ref 150–450)
POTASSIUM SERPL-SCNC: 4.3 MMOL/L (ref 3.5–5.1)
PROT SERPL-MCNC: 6.4 G/DL (ref 6.4–8.2)
RBC # BLD AUTO: 4.53 X10(6)UL
SODIUM SERPL-SCNC: 139 MMOL/L (ref 136–145)
WBC # BLD AUTO: 10.7 X10(3) UL (ref 4–11)

## 2023-11-15 PROCEDURE — 80053 COMPREHEN METABOLIC PANEL: CPT | Performed by: OBSTETRICS & GYNECOLOGY

## 2023-11-15 PROCEDURE — 85025 COMPLETE CBC W/AUTO DIFF WBC: CPT | Performed by: OBSTETRICS & GYNECOLOGY

## 2023-11-15 PROCEDURE — 83735 ASSAY OF MAGNESIUM: CPT | Performed by: OBSTETRICS & GYNECOLOGY

## 2023-11-15 RX ORDER — ACETAMINOPHEN 500 MG
1000 TABLET ORAL EVERY 8 HOURS SCHEDULED
Status: DISCONTINUED | OUTPATIENT
Start: 2023-11-15 | End: 2023-11-15

## 2023-11-15 RX ORDER — OXYCODONE HYDROCHLORIDE 5 MG/1
5 TABLET ORAL EVERY 4 HOURS PRN
Qty: 10 TABLET | Refills: 0 | Status: SHIPPED | OUTPATIENT
Start: 2023-11-15

## 2023-11-15 RX ORDER — HYDROMORPHONE HYDROCHLORIDE 1 MG/ML
INJECTION, SOLUTION INTRAMUSCULAR; INTRAVENOUS; SUBCUTANEOUS
Status: COMPLETED
Start: 2023-11-15 | End: 2023-11-15

## 2023-11-15 RX ORDER — IBUPROFEN 600 MG/1
600 TABLET ORAL EVERY 6 HOURS SCHEDULED
Status: DISCONTINUED | OUTPATIENT
Start: 2023-11-16 | End: 2023-11-15

## 2023-11-15 RX ORDER — KETOROLAC TROMETHAMINE 15 MG/ML
30 INJECTION, SOLUTION INTRAMUSCULAR; INTRAVENOUS EVERY 6 HOURS
Status: DISCONTINUED | OUTPATIENT
Start: 2023-11-15 | End: 2023-11-15

## 2023-11-15 RX ORDER — GABAPENTIN 300 MG/1
300 CAPSULE ORAL 3 TIMES DAILY
Qty: 12 CAPSULE | Refills: 0 | Status: SHIPPED | OUTPATIENT
Start: 2023-11-15

## 2023-11-15 RX ORDER — ONDANSETRON 2 MG/ML
4 INJECTION INTRAMUSCULAR; INTRAVENOUS EVERY 8 HOURS PRN
Status: DISCONTINUED | OUTPATIENT
Start: 2023-11-15 | End: 2023-11-15

## 2023-11-15 RX ORDER — GABAPENTIN 300 MG/1
300 CAPSULE ORAL 3 TIMES DAILY
Status: DISCONTINUED | OUTPATIENT
Start: 2023-11-15 | End: 2023-11-15

## 2023-11-15 RX ORDER — ONDANSETRON 4 MG/1
4 TABLET, FILM COATED ORAL EVERY 8 HOURS PRN
Status: DISCONTINUED | OUTPATIENT
Start: 2023-11-15 | End: 2023-11-15

## 2023-11-15 NOTE — DISCHARGE INSTRUCTIONS
After surgery you can expect some pelvic cramping, abdominal discomfort, and light vaginal bleeding. You may also experience a sore throat, shoulder discomfort, or muscle aches. Many patients are able to treat the pain with over the counter Acetaminophen (Tylenol), Ibuprofen (Motrin or Advil), or Naprosyn (Aleve). Your doctor will give you a prescription for a stronger pain medication if needed. Some patients experience nausea from general anesthesia and/or prescription pain medications. Your doctor may give you a prescription to help with the nausea. You should avoid tampons for the first few days after surgery (one week if you also had a hysteroscopy and/or D and C). You should avoid intercourse until vaginal bleeding has stopped, which is usually 3-5 days. You may experience discomfort at the incision sites for several weeks. The dressings can be removed after 24 hours. You may wear a band-aid or small dressing if desired. Please clean the incisions gently with mild soap and water as needed. Showering is fine the day after surgery. Avoid submerging the incisions in water (tub baths or pools) for 4-6 weeks. Most incisions are closed with dissolvable sutures and will not require suture removal.    You should rest the day of surgery. No driving for 24 hours after general anesthesia or while on prescription pain medicines. You may resume your normal exercise in 5-7 days if you are comfortable. Avoid heavy lifting for 2 weeks. You may resume your normal diet once your appetite returns after surgery. We recommend you start with a light diet to decrease nausea. Do not drink alcohol or use other sedating medications (sleep aids, sedating cold medications) if taking prescription pain medications. Resume your normal medications as instructed.     Please call our office if you experience any of the following symptoms:  Temperature over 100.5 degrees  Vaginal bleeding heavier than a moderate period  Significant swelling, redness, or discharge at the incision sites  Severe abdominal/pelvic pain  Shortness of breath or chest pain  New onset of leg pain and /or swelling    If a specimen was sent to pathology, you can expect a call from your doctor within 10 days with the report. You should have a post op appointment in 2 weeks. Please call with any other questions or concerns.     Office Number: [phone]

## 2023-11-15 NOTE — PLAN OF CARE
2035 Patient taken by transport to OR for procedure. 0130 Patient returned from OR.  4 Lap sites, Derma bound. Currently covered with ABD pad and gauze. Patient in a lot of pain after arriving to floor, Toradol started and patient more comfortable now.

## 2023-11-15 NOTE — ANESTHESIA PROCEDURE NOTES
Airway  Date/Time: 11/14/2023 9:42 PM  Urgency: elective      General Information and Staff    Patient location during procedure: OR  Anesthesiologist: Mariya Shane MD  Performed: anesthesiologist   Performed by: Mariya Shane MD  Authorized by:  Mariya Shane MD      Indications and Patient Condition  Indications for airway management: anesthesia  Sedation level: deep  Preoxygenated: yes  Patient position: sniffing  Mask difficulty assessment: 1 - vent by mask    Final Airway Details  Final airway type: endotracheal airway      Successful airway: ETT  Cuffed: yes   Successful intubation technique: direct laryngoscopy  Facilitating devices/methods: intubating stylet  Endotracheal tube insertion site: oral  Blade: Petar  Blade size: #3  ETT size (mm): 7.5    Cormack-Lehane Classification: grade I - full view of glottis  Placement verified by: capnometry   Cuff volume (mL): 10  Measured from: lips  Number of attempts at approach: 1  Number of other approaches attempted: 0    Additional Comments  Dentition unchanged from pre op exam

## 2023-11-15 NOTE — PLAN OF CARE
Incision cdi  Plan for ambulation and possible dc today  Pain control  Tolerating liquids and food  Family at bedside

## 2023-11-15 NOTE — PROGRESS NOTES
Note entered in delay secondary to patient care    MRI results reviewed with Dr. Joao Guardado remotely  Pedunculated fibroid with small stalk noted   Ca125 and LDH WNL  Patient continues to be in severe pain  Discussed that given torsed fibroid, case will be done as soon as safely able  Reviewed plan for dx laparoscopy, laparoscopic myomectomy and removal of specimen via mini laparotomy  Discussed possibility of abdominal approach if necessary   Reviewed risks of bleeding, infection and injury to surrounding structures  Reviewed expectations for post-op  All questions answered  Patient understands if OR schedule does not permit for procedure today, will plan for next available surgery time      Neelam Urbano MD

## 2023-11-15 NOTE — OPERATIVE REPORT
[facility]  Operative Note    Pre-Op Diagnosis: pelvic pain, suspected torsed pedunculated fibroid    Post-Op Diagnosis: same    Procedure: diagnostic laparoscopy, laparoscopic myomectomy, mini laparotomy for specimen removal    Surgeon: Amberly Valderrama MD    Assistant: Chanel Pryor MD    The involvement of the assisting physician was necessary in order to provide aid in exposure/retraction, hemostasis, closure, and other intraoperative technical functions in order to facilitate me as the primary surgeon carry out a safe operation with optimized results/outcome. Anesthesia: general    Surgical Findings:  Large firm mass in posterior cul-de-sac on vaginal exam, approximately 6cm  Unable to access uterine cavity due to likely submucosal fibroid  Normal appearing vaginal and cervix  Four pedunculated fibroids, two on fundus, one on left posterior aspect of uterus  Right, posterior fibroid torsed 2x on thin stalk   3-4 small paratubal cysts noted on right fimbria  Normal appearing right ovary  Left tube grossly normal appearing  Left ovary not fully visualized but appears normal      Specimen: degenerating fibroid    EBL:  77BW    Complications: None    Disposition: Recovery room in stable condition. Patient was taken to operating room where GETA was obtained without difficulty. She was prepared and draped in the usual sterile fashion in the dorsal lithotomy position. Arms were tucked at the sides in [place] position taking care to avoid nerve injury. Surgical time out was performed confirming correct patient and procedure. Attention was turned to perineum where pretty catheter was placed using sterile technique. Bivalved speculum was inserted into the vagina to visualize the cervix. Single tooth tenaculum was applied to the anterior lip of the cervix. Attempt was made to sound the uterus but was unable to pass uterine sound due to mass, likely fibroid. [place] manipulator was placed instead. Surgeon's gloves were exchanged. Attention was then turned to the abdomen, where a 5mm incision was made at the inferior aspect of the umbilicus. Veress needle was inserted and saline drop test confirmed intraperitoneal placement of veress needle. CO2 gas was insufflated into abdominal cavity. Opening pressure was 5mmHg. The 5mm trochar was inserted under direct visualization with the 5mm laparoscope. Intraabdominal placement was confirmed via direct visualization and no injury at insertion site was noted. An additional 12mm port was placed in the RLQ and 5mm port in LLQ under direct visualization. Above findings were noted. The camera was placed in the lower abdominal port to confirm no injury at the umbilical site. The visible portion of the fibroid was injected superficially was dilute vasopressin. A single tooth tenaculum was used in this area to lift the torsed fibroid out of the pelvis. The fibroid was then de-torsed and a thin stalk was noted. Enseal device was carefully used to cauterize and cut the stalk at its base until the fibroid was . Good hemostasis was noted. The fibroid was placed into the endocatch bag. A mini-Pfannenstiel incision was made and carried down through the underlying subcutaneous tissue to the fascia using a scalpel. Rectus fascia was then incised in the midline and extended laterally using Pike scissors. The superior edge of the fascia was grasped with Kocher clamps, tented upward, and the underlying muscle was bluntly dissected off. The inferior edge was grasped with Kocher clamps and cleared in similar fashion. All anatomic layers were well-demarcated. The rectus muscles were  and the peritoneum was identified and subsequently entered and extended longitudinally with blunt dissection. The endo-catch bag was grasped and the specimen was removed. Good hemostasis was noted. The fascia was then closed in a running fashion with 0-vicryl.  Subcateous tissue was clsoed with plain gut and skin was closed with 4-0 monocryl. This incision was dressed with steri strips and a pressure dressing. The laparoscopic ports were removed after CO2 gas was removed from the abdominal cavity. The fascia at the 12mm site was palpated and felt to be less than 10mm. The skin incisions were closed with 4-0 monocryl subcuticular suture. Incision were dressed with surgical glue. Bivalved speculum was inserted into the vagina. [place] and single tooth tenaculum were removed. Good hemostasis was noted. Proctor catheter were removed. The patient tolerated the procedure well. Sponge/lap/needle counts were correct x2.       Hunter Schafer MD

## 2023-11-17 NOTE — PAYOR COMM NOTE
Discharge Notification    Patient Name: Walt Allison  Payor: RASHID  Subscriber #: [id number]  Authorization Number: [id number]  Admit Date/Time: 11/14/2023 5:17 AM  Discharge Date/Time: 11/15/2023 2:42 PM

## 2023-11-26 NOTE — DISCHARGE SUMMARY
[address]  Patient Status:  Observation    10/5/1990 MRN [id number]   [place] 0SW-A Attending No att. providers found   Hosp Day # 0 PCP Malachi Swift MD     Date of Admission: 2023  Date of Discharge: 11/15/2023  Discharge Disposition: Home or Self Care    Discharge Diagnosis: Torsed pedunculated fibroid    History of Present Illness:  34year old female with history of anxiety, migraines, seasonal allergies presenting with lower abdominal pain. Patient was seen by gynecology in consultation. MRI results evaluated with concern for pedunculated fibroid causing patient's pain. Patient underwent laparoscopic myomectomy with removal of specimen. Postoperative patient's pain improved. Patient with postoperative pain controlled. Patient tolerating diet. Patient ambulating. Patient clinically stable, vital stable, labs stable for discharge. Patient agreeable discharge. Gynecology agreeable discharge. Discharge Medication List:     Discharge Medications        START taking these medications        Instructions Prescription details   gabapentin 300 MG Caps  Commonly known as: Neurontin      Take 1 capsule (300 mg total) by mouth 3 (three) times daily. Quantity: 12 capsule  Refills: 0     oxyCODONE 5 MG Tabs      Take 1 tablet (5 mg total) by mouth every 4 (four) hours as needed for Pain. Quantity: 10 tablet  Refills: 0            CONTINUE taking these medications        Instructions Prescription details   ASHWAGANDHA OR      Take 1 gummy by mouth once daily   Refills: 0     loratadine 10 MG Tabs  Commonly known as: Claritin      Take 1 tablet (10 mg total) by mouth daily. Refills: 0     predniSONE 20 MG Tabs  Commonly known as: Deltasone      Take 1 tablet (20 mg total) by mouth daily. Refills: 0     PROBIOTIC OR      Take 1 tablet by mouth daily.    Refills: 0     Rizatriptan Benzoate 10 MG Tabs  Commonly known as: MAXALT      Take 1 tablet (10 mg total) by mouth as needed for Migraine. Refills: 0     [facility] OR      Take 1 tablet by mouth daily. Refills: 0     SUMAtriptan 50 MG Tabs  Commonly known as: Imitrex      Take 1 tablet (50 mg total) by mouth every 2 (two) hours as needed. Refills: 0     triamcinolone 0.1 % Crea  Commonly known as: Kenalog      APPLY TOPICALLY TO THE AFFECTED AREA TWICE DAILY FOR UP TO 2 WEEKS AS NEEDED   Refills: 0     VITAMIN C OR      Take 1 tablet by mouth daily. Refills: 0     Vitamin D3 Gummies Adult 25 MCG (1000 UT) Chew  Generic drug: Cholecalciferol      Chew 2 tablets by mouth daily. Refills: 0               Where to Get Your Medications        These medications were sent to [address], [address] AT [facility], [phone], [phone]  [address] [facility], [facility] [id number]      Phone: [phone]   gabapentin 300 MG Caps       Please  your prescriptions at the location directed by your doctor or nurse    Bring a paper prescription for each of these medications  oxyCODONE 5 MG Tabs       Follow-up appointment:   Abilio Louie, [address] [facility] [address]  [phone]    Follow up in 2 week(s)      MD Genaro Moyerelvie [phone] [id number] [facility] [phone]    Follow up  As needed    Appointments for Next 30 Days 11/26/2023 - 12/26/2023        Date Arrival Time Visit Type Length Department Provider     12/21/2023  2:15 PM  [facility] MRI WRIST WO RT [facility] 39 min [facility] MRI [facility] MR RM3 (3T WIDE)    Patient Instructions:     Please arrive 30 minutes prior to your scheduled appointment time. You will need time to change your clothes, fill out screening forms, use the restroom, and may need an IV if your exam requires contrast.  If you arrive too late, your appointment may need to be rescheduled.     IF YOU REQUIRE ORAL SEDATION FOR YOUR MRI: Your physician is responsible for giving you a prescription for oral medication which you would fill at your local pharmacy. If you will be taking oral sedation, you must bring a  who will drive you home (the  does not necessarily have to stay throughout the procedure). Location Instructions: Your appointment will be at [facility] located at [address]. [address] [place], [address] To reach Outpatient Registration, you may park or [address] in the [address] Enter the double doors located on the ground floor and proceed to the lobby past the Information Desk to Outpatient Registration. &nbsp; The phone number for this location is [phone]. Because of the nature of the Emergency Department, please be advised of the possibility your appointment may be delayed at this location. Due to safety reasons you will be required to change into a gown. You will be asked to remove all metallic items, such as watches, jewelry, hairpins, eyeglasses, hearing aids, and continuous glucose monitoring devices. Your purse, wallet or other personal items will remain in a secure locker or with your family during your exam.  Please bring your insurance card and photo ID. You will also need to bring your doctor's order unless your physician's office submitted the order electronically or faxed the order. Without the order, your test may be delayed or postponed. Children: Children under the age of 15 must have another adult caregiver with them.&nbsp; Please do not bring your child/children without a caregiver. &nbsp; Because of the highly sensitive equipment and privacy of all our patients, children will not be permitted in the exam rooms, unless otherwise noted and in accordance with departmental policy.   PATIENT RESPONSIBILITY ESTIMATE  - (Estimate) We will provide you with your estimated remaining deductible and coinsurance balance for your services at the time of check in.  - (Payment) Please be aware that you may be asked for payment at the time of service.  - (Questions) If you would like more information about your Patient Responsibility Estimate in advance of your appointment, you can speak to a  ([phone], option 5). Masks are optional for all patients and visitors, unless otherwise indicated. Vital signs:   Stable    Physical Exam:    General: No acute distress. Respiratory: Clear to auscultation bilaterally. No wheezes. No crackles  Cardiovascular: S1, S2. Regular rate and rhythm. No murmurs  Abdomen: Soft, Post surgical tenderness, nondistended. Positive bowel sounds. No rebound or guarding. Musculoskeletal: Moves all extremities. Extremities: No edema. -----------------------------------------------------------------------------------------------  PATIENT DISCHARGE INSTRUCTIONS: See electronic chart    ASSESSMENT / PLAN:   34year old female with history of anxiety, migraines, seasonal allergies presenting with lower abdominal pain     Lower abdominal pain --> improved  -ct showed pelvic mass--> torsed pedunculated fibroid   -gyn consulted -- . POD # 1 s/p diagnostic laparoscopy, laparoscopic myomectomy, mini laparotomy for specimen removal.  -Post operative pain controlled --> pain meds per gyn on dc    Plan of care discussed with patient and staff     Dispo: discharge     Hugo Brandt MD  [address] [place] [address],[address]  [phone]    Time spent:  > 35 minutes

## 2024-04-29 ENCOUNTER — HOSPITAL ENCOUNTER (OUTPATIENT)
Dept: MRI IMAGING | Facility: HOSPITAL | Age: 34
Discharge: HOME | End: 2024-04-29
Attending: PHYSICIAN ASSISTANT
Payer: COMMERCIAL

## 2024-04-29 DIAGNOSIS — M25.539 PAIN IN WRIST, UNSPECIFIED LATERALITY: ICD-10-CM

## 2024-04-29 PROCEDURE — 73221 MRI JOINT UPR EXTREM W/O DYE: CPT | Performed by: PHYSICIAN ASSISTANT

## 2024-05-08 ENCOUNTER — OFFICE VISIT (OUTPATIENT)
Dept: ORTHOPEDICS CLINIC | Facility: CLINIC | Age: 34
End: 2024-05-08
Payer: COMMERCIAL

## 2024-05-08 VITALS — HEIGHT: 64 IN | WEIGHT: 200 LBS | BODY MASS INDEX: 34.15 KG/M2

## 2024-05-08 DIAGNOSIS — G56.01 CARPAL TUNNEL SYNDROME OF RIGHT WRIST: Primary | ICD-10-CM

## 2024-05-08 PROCEDURE — 99024 POSTOP FOLLOW-UP VISIT: CPT | Performed by: PHYSICIAN ASSISTANT

## 2024-05-08 RX ORDER — MELOXICAM 15 MG/1
15 TABLET ORAL DAILY
Qty: 30 TABLET | Refills: 0 | Status: SHIPPED | OUTPATIENT
Start: 2024-05-08

## 2024-05-08 NOTE — PROGRESS NOTES
Clinic Note [facility] Orth[facility]     Assessment/Plan:  33 year old female    Right wrist volar radial pain with radiation into the fingertips, MRI is negative-given the volar wrist pain and radiation into the fingertips when it is worse we will order an EMG for ruling out early carpal tunnel syndrome.  She did have an injection at the previous provider's office and had relief of symptoms for a few months.  This could be diagnostic of carpal tunnel.  I will have her follow-up after the EMG.  All of her questions were answered      ICD-10-CM    1. Carpal tunnel syndrome of right wrist  G56.01 EMG           Follow Up: After EMG    Diagnostic Studies:  X-rays are negative for fracture  MRI of the wrist reveals no volar carpal radial mass.    Physical Exam:    Ht 5' 4\" (1.626 m)   Wt 200 lb (90.7 kg)   LMP 10/31/2023 (Exact Date)   BMI 34.33 kg/m²     Constitutional: NAD. AOx3. Well-developed and Well-nourished.   Psychiatric: Normal mood/ affect/ behavior. Judgment and thought content normal.     Right upper Extremity:   Inspection: Skin Intact. No skin lesions. No gross deformity.   Palpation: Tender palpation over the volar wrist but no focal areas of tenderness   Motion: Elbow: normal bilateral symmetric ext/flex  Wrist: normal bilateral symmetric ext/flex/sup/pro  Finger: full composite fist       CC: Follow - Up (RT WRIST MRI RESULTS)        HPI: This 33 year old female presents with right wrist pain.  She states has been ongoing over 6 months.  She feels like it is from repetitive computer use.  She states its throbbing and aching as well as sharp shooting and intermittent.  She does have some cramping.  She rates it as severe.  She says it is always in pain and very \"fatigued.  She has tried bracing and anti-inflammatories with no relief.  She does have pain at night.  She denies any numbness    Interval history: Patient had her MRI and she is here for review.    Past Medical History:    Anxiety state     HEADACHES    migraines - Dr. Gil    Migraines    SEASONAL ALLERGIES    Visual impairment     Past Surgical History:   Procedure Laterality Date    Exc skin benig 0.6-1cm trunk,arm,leg N/A 10/27/2015    Procedure: ANAL FISSURECTOMY / FISTULOTOMY /ANALPLASTY;  Surgeon: Sha Padron;  Location: [facility]    Other surgical history  3/4/14    excision of perineal cyst, anoscopy by Dr. Padron @ [facility]    Removal of anal tag N/A 10/27/2015    Procedure: ANAL FISSURECTOMY / FISTULOTOMY /ANALPLASTY;  Surgeon: Sha Padron;  Location: [facility]     Current Outpatient Medications   Medication Sig Dispense Refill    Meloxicam 15 MG Oral Tab Take 1 tablet (15 mg total) by mouth daily. Take once daily for 4 weeks 30 tablet 0    oxyCODONE 5 MG Oral Tab Take 1 tablet (5 mg total) by mouth every 4 (four) hours as needed for Pain. 10 tablet 0    gabapentin 300 MG Oral Cap Take 1 capsule (300 mg total) by mouth 3 (three) times daily. 12 capsule 0    Rizatriptan Benzoate 10 MG Oral Tab Take 1 tablet (10 mg total) by mouth as needed for Migraine.      predniSONE 20 MG Oral Tab Take 1 tablet (20 mg total) by mouth daily.      triamcinolone 0.1 % External Cream APPLY TOPICALLY TO THE AFFECTED AREA TWICE DAILY FOR UP TO 2 WEEKS AS NEEDED      ASHWAGANDHA OR Take 1 gummy by mouth once daily      Ascorbic Acid (VITAMIN C OR) Take 1 tablet by mouth daily.      Cholecalciferol (VITAMIN D3 GUMMIES ADULT) 25 MCG (1000 UT) Oral Chew Tab Chew 2 tablets by mouth daily.      ST JOHNS WORT OR Take 1 tablet by mouth daily.      Probiotic Product (PROBIOTIC OR) Take 1 tablet by mouth daily.      loratadine 10 MG Oral Tab Take 1 tablet (10 mg total) by mouth daily.      SUMAtriptan 50 MG Oral Tab Take 1 tablet (50 mg total) by mouth every 2 (two) hours as needed.       Allergies   Allergen Reactions    Amoxicillin DIARRHEA    Ceclor UNKNOWN     As a child    Cefaclor UNKNOWN     As a baby    Morphine  NAUSEA ONLY and DIZZINESS    Sulfa Antibiotics NAUSEA AND VOMITING     Family History   Problem Relation Age of Onset    Alcohol and Other Disorders Associated Mother     Diabetes Other     Schizophrenia Paternal Grandmother     Depression Sister     Personality Disorder Sister     Suicide History Sister      Social History     Occupational History    Not on file   Tobacco Use    Smoking status: Never    Smokeless tobacco: Never   Vaping Use    Vaping status: Never Used   Substance and Sexual Activity    Alcohol use: Yes     Alcohol/week: 0.0 standard drinks of alcohol     Comment: social    Drug use: Not Currently     Frequency: 7.0 times per week     Types: Cannabis     Comment: Daily    Sexual activity: Not Currently     Partners: Male        Review of Systems (negative unless bolded):  General: fevers, chills, fatigue  CV:  chest pain, palpitations, leg swelling  Msk: bodyaches, neck pain, neck stiffness  Skin: rashes, open wounds, nonhealing ulcers  Hem: bleeds easily, bruise easily, immunocompromised  Neuro: dizziness, light headedness, headaches  Psych: anxious, depressed, anger issues      Connie Salgado PA-C  Hand, Wrist, & Elbow Surgery  Physician Assistant to Dr. Mitch Lobo  [facility]  [address] [place] [address], [address], [email or web address]  [email or web address]  t: [phone]  f: [phone]

## 2024-05-13 ENCOUNTER — TELEPHONE (OUTPATIENT)
Dept: ORTHOPEDICS CLINIC | Facility: CLINIC | Age: 34
End: 2024-05-13

## 2024-05-13 NOTE — TELEPHONE ENCOUNTER
Spoke with pt. Pt states that she was told by Connie that she needed to wait to take the pain medication until after the EMG-Not testing until June 5th.    She is wondering if she can take it and just stop a day or two or if she can take something else for pain?    Advised that we will check and call her back~    Please advise?

## 2024-06-05 ENCOUNTER — PROCEDURE VISIT (OUTPATIENT)
Dept: PHYSICAL MEDICINE AND REHAB | Facility: CLINIC | Age: 34
End: 2024-06-05
Payer: COMMERCIAL

## 2024-06-05 DIAGNOSIS — M25.531 PAIN IN BOTH WRISTS: Primary | ICD-10-CM

## 2024-06-05 DIAGNOSIS — M25.532 PAIN IN BOTH WRISTS: Primary | ICD-10-CM

## 2024-06-05 PROCEDURE — 95886 MUSC TEST DONE W/N TEST COMP: CPT | Performed by: PHYSICAL MEDICINE & REHABILITATION

## 2024-06-05 PROCEDURE — 95911 NRV CNDJ TEST 9-10 STUDIES: CPT | Performed by: PHYSICAL MEDICINE & REHABILITATION

## 2024-06-05 NOTE — PROCEDURES
[address] [address]  Telephone number: [phone]  Fax number: [phone]        Full Name: IVAN LONG Gender: Female  Patient ID: [id number] YOB: 1990      Visit Date: 6/5/2024 3:52 PM  Age: 33 Years  Examining Physician: Erwin Cartwright DO  Referring Physician: Connie Marino PA-C  Height: 5 feet 4 inch  Weight: 200 lbs  History: 33 year old right handed female presents with right wrist pain x2 years, can shoot into the forearm and hand. Intermittent numbness and tingling in the right hand. Has had intermittent symptoms to a lesser degree in the left hand/wrist.     PMH: No history of DM, thyroid disease or EtOH abuse    Physical exam:  5/5 BUE strength  SILT m/r/u distributions of both hands  2/4 BUE reflexes  Tinel's test over the wrists negative b/l      Sensory NCS      Nerve / Sites Rec. Site Onset Lat Peak Lat NP Amp PP Amp Segments Distance Peak Diff Velocity Comment     ms ms µV µV  cm ms m/s    R Median - Dig II (Antidromic)      Wrist Index 2.29 3.02 40.9 61.7 Wrist - Index 14  61    L Median - Dig II (Antidromic)      Wrist Index 2.40 3.23 53.3 96.9 Wrist - Index 14  58    R Ulnar - Dig V (Antidromic)      Wrist Dig V 2.45 3.02 28.9 55.9 Wrist - Dig V 14  57    L Ulnar - Dig V (Antidromic)      Wrist Dig V 2.45 3.18 35.6 58.8 Wrist - Dig V 14  57    R Radial - Superficial (Antidromic)      Forearm Wrist 1.56 2.03 51.9 31.0 Forearm - Wrist 10  64    L Radial - Superficial (Antidromic)      Forearm Wrist 1.51 2.08 33.6 46.7 Forearm - Wrist 10  66    R Median, Ulnar - Transcarpal comparison      Median Palm Wrist 1.51 1.93 66.3 81.2 Median Palm - Wrist 8  53       Ulnar Palm Wrist 1.51 1.93 18.8 10.1 Ulnar Palm - Wrist 8  53          Median Palm - Ulnar Palm  0.00     L Median, Ulnar - Transcarpal comparison      Median Palm Wrist 1.46 1.93 51.9 53.7 Median Palm - Wrist 8  55       Ulnar Palm Wrist 1.15 1.88 19.7 13.1 Ulnar Palm - Wrist  8  70          Median Palm - Ulnar Palm  0.05         Motor NCS      Nerve / Sites Muscle Latency Amplitude Segments Dist. Lat Diff Velocity Comments     ms mV  cm ms m/s    R Median - APB      Wrist APB 2.77 14.1 Wrist - APB 8         Elbow APB 6.81 12.4 Elbow - Wrist 23.5 4.04 58.1    L Median - APB      Wrist APB 2.73 15.1 Wrist - APB 8         Elbow APB 6.79 11.5 Elbow - Wrist 24 4.06 59.1    R Ulnar - ADM      Wrist ADM 2.54 6.0 Wrist - ADM 8         B.Elbow ADM 6.44 5.6 B.Elbow - Wrist 22.5 3.90 57.8       A.Elbow ADM 8.58 4.5 A.Elbow - B.Elbow 12 2.15 55.9    L Ulnar - ADM      Wrist ADM 2.46 9.0 Wrist - ADM 8         B.Elbow ADM 6.13 8.3 B.Elbow - Wrist 22 3.67 60.0       A.Elbow ADM 7.40 8.4 A.Elbow - B.Elbow 10 1.27 78.7        EMG Summary Table     Spontaneous MUAP Recruitment   Muscle IA Fib PSW Fasc H.F. Amp Dur. PPP Pattern   R. Deltoid N None None None None N N N N   R. Biceps brachii N None None None None N N N N   R. Triceps brachii N None None None None N N N N   R. Pronator teres N None None None None N N N N   R. First dorsal interosseous N None None None None N N N N       Summary    The motor conduction test was performed on 4 nerve(s). The results were normal in 4 nerve(s): R Median - APB, L Median - APB, L Ulnar - ADM, R Ulnar - ADM.    The sensory conduction test was performed on 8 nerve(s). The results were normal in 6 nerve(s): R Median - Dig II (Antidromic), L Median - Dig II (Antidromic), R Ulnar - Dig V (Antidromic), L Ulnar - Dig V (Antidromic), R Radial - Superficial (Antidromic), L Radial - Superficial (Antidromic). Findings were unremarkable in 2 nerve(s): R Median, Ulnar - Transcarpal comparison, L Median, Ulnar - Transcarpal comparison. There were no results outside the specified normal range.    Selected needle study was done in the right upper extremity only. The needle EMG study was normal in all 5 tested muscles: R. Deltoid, R. Biceps brachii, R. Triceps brachii, R. Pronator  QUINTIN berg First dorsal interosseous.          Conclusion:   This is a normal electrodiagnostic study.  There is no electrodiagnostic evidence of mononeuropathy in the bilateral upper extremities.  There is no electrodiagnostic evidence of a right brachial plexopathy or cervical radiculopathy.    Thank you for allowing me to participate in this patient's care,    Erwin Cartwright DO  Physical Medicine and Rehabilitation  [facility]

## 2024-06-11 NOTE — TELEPHONE ENCOUNTER
Meloxicam 15 mg   DOS: N/A  Last OV: 05/08/24  Last refill date: 05/08/24     #/refills: 30/0  Upcoming appt: No future appointments.    04/02/24  Blood Urea Nitrogen  6.0 - 20.0 mg/dL 11.0   Creatinine  0.5 - 0.9 mg/dL 0.79   BUN/CREAT Ratio  10.0 - 20.0 14.0     GFR CKD-EPI  >=60.00 mL/min/1.73 m² 115.35

## 2024-06-12 RX ORDER — MELOXICAM 15 MG/1
15 TABLET ORAL DAILY
Qty: 30 TABLET | Refills: 0 | Status: SHIPPED | OUTPATIENT
Start: 2024-06-12

## (undated) DEVICE — 40580 - THE PINK PAD - ADVANCED TRENDELENBURG POSITIONING KIT: Brand: 40580 - THE PINK PAD - ADVANCED TRENDELENBURG POSITIONING KIT

## (undated) DEVICE — ANCHOR TISSUE RETRIEVAL SYSTEM, BAG SIZE 175 ML, PORT SIZE 10 MM: Brand: ANCHOR TISSUE RETRIEVAL SYSTEM

## (undated) DEVICE — HUNTER GASPER TIP, DISPOSABLE: Brand: RENEW

## (undated) DEVICE — SOLUTION IRRIG 1000ML 0.9% NACL USP BTL

## (undated) DEVICE — SOL  .9 1000ML BTL

## (undated) DEVICE — BIPOLAR MARYLAND DISSECTOR: Brand: FLEX

## (undated) DEVICE — PAD ULNAR NERVE PROTECTOR

## (undated) DEVICE — UNDYED BRAIDED (POLYGLACTIN 910), SYNTHETIC ABSORBABLE SUTURE: Brand: COATED VICRYL

## (undated) DEVICE — DERMABOND LIQUID ADHESIVE

## (undated) DEVICE — KENDALL SCD EXPRESS SLEEVES, KNEE LENGTH, MEDIUM: Brand: KENDALL SCD

## (undated) DEVICE — GYN LAP/ROBOTIC: Brand: MEDLINE INDUSTRIES, INC.

## (undated) DEVICE — TROCAR: Brand: KII FIOS FIRST ENTRY

## (undated) DEVICE — STERILE POLYISOPRENE POWDER-FREE SURGICAL GLOVES: Brand: PROTEXIS

## (undated) DEVICE — SUTURE VICRYL 4-0 PC-5

## (undated) DEVICE — [HIGH FLOW INSUFFLATOR,  DO NOT USE IF PACKAGE IS DAMAGED,  KEEP DRY,  KEEP AWAY FROM SUNLIGHT,  PROTECT FROM HEAT AND RADIOACTIVE SOURCES.]: Brand: PNEUMOSURE

## (undated) DEVICE — SUTURE MCRYL SZ 4-0 L18IN ABSRB UD L19MM PS-2

## (undated) DEVICE — INSUFFLATION NEEDLE TO ESTABLISH PNEUMOPERITONEUM.: Brand: INSUFFLATION NEEDLE

## (undated) DEVICE — ENSEAL X1 STRAIGHT 37CM SHAFT: Brand: HARMONIC

## (undated) DEVICE — RECTAL CDS-LF: Brand: MEDLINE INDUSTRIES, INC.

## (undated) DEVICE — SLEEVE COMPR M KNEE LEN SGL USE KENDALL SCD

## (undated) DEVICE — TROCAR: Brand: KII SLEEVE

## (undated) DEVICE — PLUMEPORT ACTIV LAPAROSCOPIC SMOKE FILTRATION DEVICE: Brand: PLUMEPORT ACTIVE

## (undated) DEVICE — VIOLET BRAIDED (POLYGLACTIN 910), SYNTHETIC ABSORBABLE SUTURE: Brand: COATED VICRYL

## (undated) DEVICE — ENDOCUT SCISSOR TIP, DISPOSABLE: Brand: RENEW

## (undated) NOTE — ED AVS SNAPSHOT
Charles Austin   MRN: [id number]    Department:  [facility] Emergency Department   Date of Visit:  5/2/2018           Disclosure     Insurance plans vary and the physician(s) referred by the ER may not be covered by your plan.  Please contact you tell this physician (or your personal doctor if your instructions are to return to your personal doctor) about any new or lasting problems. The primary care or specialist physician will see patients referred from the [facility] Emergency Department.  Avelino Almonte

## (undated) NOTE — LETTER
Date & Time: 5/2/2018, 1:45 PM  Patient: Ambrosio Holcomb  Encounter Provider(s):    Danyel Bright MD       To Whom It May Concern:    Brittany Cardenas was seen and treated in our department on 5/2/2018. She may return to work.   If you have any questions

## (undated) NOTE — IP AVS SNAPSHOT
[facility]  [address] [facility], [address] [place] [address] Rd ~ [phone]                Discharge Summary   6/19/2017    Kishor Merino           Admission Information        Provider Department    6/19/2017 Gracie Rajput MD  Cydney Gutierrez / Patricia Lopez Commonly known as:  KLONOPIN        TAKE 3 TABLETS BY MOUTH EVERY NIGHT AT BEDTIME AS NEEDED    Tonja Allan     [    ]    [    ]    [    ]    [    ]       EVENING PRIMROSE OIL OR        Take 1 tablet by mouth daily.       [    ]    [    ]    [    ]    [ removed in 24 hours and replaced as needed.    • If you experience fever, chills, inability to urinate, nausea with vomiting, severe  diarrhea or severe, cramping abdominal pain please contact your surgeon      APPOINTMENT  • You should make an appointment Immunization History as of 6/19/2017  Never Reviewed    DT 5/22/2003    DTP 10/18/1996, 3/9/1994, 4/21/1993, 2/19/1993, 12/16/1992    HEP B 4/21/1993, 11/2/1992, 10/5/1992    HIB 3/9/1994, 4/21/1993, 2/19/1993, 12/16/1992    HPV (Gardasil) 10/16/2013, 1/17 discharge instructions in [employer] by going to Visits < Admission Summaries. If you've been to the Emergency Department or your doctor's office, you can view your past visit information in [employer] by going to Visits < Visit Summaries. "[employer] questions?